# Patient Record
Sex: FEMALE | Race: WHITE | Employment: FULL TIME | URBAN - METROPOLITAN AREA
[De-identification: names, ages, dates, MRNs, and addresses within clinical notes are randomized per-mention and may not be internally consistent; named-entity substitution may affect disease eponyms.]

---

## 2021-02-01 ENCOUNTER — APPOINTMENT (OUTPATIENT)
Dept: GENERAL RADIOLOGY | Age: 26
End: 2021-02-01
Payer: COMMERCIAL

## 2021-02-01 ENCOUNTER — HOSPITAL ENCOUNTER (EMERGENCY)
Age: 26
Discharge: HOME | End: 2021-02-01
Attending: EMERGENCY MEDICINE
Payer: COMMERCIAL

## 2021-02-01 VITALS
TEMPERATURE: 98.4 F | WEIGHT: 135 LBS | SYSTOLIC BLOOD PRESSURE: 102 MMHG | HEART RATE: 95 BPM | OXYGEN SATURATION: 97 % | HEIGHT: 59 IN | RESPIRATION RATE: 20 BRPM | BODY MASS INDEX: 27.21 KG/M2 | DIASTOLIC BLOOD PRESSURE: 68 MMHG

## 2021-02-01 DIAGNOSIS — U07.1 COVID-19 VIRUS INFECTION: Primary | ICD-10-CM

## 2021-02-01 LAB
ANION GAP SERPL CALCULATED.3IONS-SCNC: 10 MMOL/L (ref 3–16)
ANISOCYTOSIS: ABNORMAL
BASE EXCESS VENOUS: -0.1 MMOL/L (ref -2–3)
BASOPHILS ABSOLUTE: 0 K/UL (ref 0–0.2)
BASOPHILS RELATIVE PERCENT: 0.3 %
BUN BLDV-MCNC: 9 MG/DL (ref 7–20)
CALCIUM SERPL-MCNC: 8.6 MG/DL (ref 8.3–10.6)
CARBOXYHEMOGLOBIN: 0.9 % (ref 0–1.5)
CHLORIDE BLD-SCNC: 102 MMOL/L (ref 99–110)
CO2: 24 MMOL/L (ref 21–32)
CREAT SERPL-MCNC: 0.7 MG/DL (ref 0.6–1.1)
EOSINOPHILS ABSOLUTE: 0 K/UL (ref 0–0.6)
EOSINOPHILS RELATIVE PERCENT: 0.1 %
GFR AFRICAN AMERICAN: >60
GFR NON-AFRICAN AMERICAN: >60
GLUCOSE BLD-MCNC: 111 MG/DL (ref 70–99)
HCO3 VENOUS: 25.9 MMOL/L (ref 24–28)
HCT VFR BLD CALC: 34.8 % (ref 36–48)
HEMOGLOBIN: 10.9 G/DL (ref 12–16)
LYMPHOCYTES ABSOLUTE: 1.5 K/UL (ref 1–5.1)
LYMPHOCYTES RELATIVE PERCENT: 37.8 %
MCH RBC QN AUTO: 21.9 PG (ref 26–34)
MCHC RBC AUTO-ENTMCNC: 31.4 G/DL (ref 31–36)
MCV RBC AUTO: 69.7 FL (ref 80–100)
METHEMOGLOBIN VENOUS: 0.2 % (ref 0–1.5)
MONOCYTES ABSOLUTE: 0.4 K/UL (ref 0–1.3)
MONOCYTES RELATIVE PERCENT: 11.2 %
NEUTROPHILS ABSOLUTE: 2 K/UL (ref 1.7–7.7)
NEUTROPHILS RELATIVE PERCENT: 50.6 %
O2 SAT, VEN: 47 %
PCO2, VEN: 46.7 MMHG (ref 41–51)
PDW BLD-RTO: 16.3 % (ref 12.4–15.4)
PH VENOUS: 7.35 (ref 7.35–7.45)
PLATELET # BLD: 181 K/UL (ref 135–450)
PLATELET SLIDE REVIEW: ADEQUATE
PMV BLD AUTO: 8.2 FL (ref 5–10.5)
PO2, VEN: 28.5 MMHG (ref 25–40)
POLYCHROMASIA: ABNORMAL
POTASSIUM SERPL-SCNC: 3.5 MMOL/L (ref 3.5–5.1)
RBC # BLD: 5 M/UL (ref 4–5.2)
SLIDE REVIEW: ABNORMAL
SODIUM BLD-SCNC: 136 MMOL/L (ref 136–145)
TCO2 CALC VENOUS: 27 MMOL/L
WBC # BLD: 4 K/UL (ref 4–11)

## 2021-02-01 PROCEDURE — 82803 BLOOD GASES ANY COMBINATION: CPT

## 2021-02-01 PROCEDURE — 6360000002 HC RX W HCPCS: Performed by: NURSE PRACTITIONER

## 2021-02-01 PROCEDURE — 99284 EMERGENCY DEPT VISIT MOD MDM: CPT

## 2021-02-01 PROCEDURE — 71045 X-RAY EXAM CHEST 1 VIEW: CPT

## 2021-02-01 PROCEDURE — 96374 THER/PROPH/DIAG INJ IV PUSH: CPT

## 2021-02-01 PROCEDURE — 2580000003 HC RX 258: Performed by: NURSE PRACTITIONER

## 2021-02-01 PROCEDURE — 85025 COMPLETE CBC W/AUTO DIFF WBC: CPT

## 2021-02-01 PROCEDURE — 80048 BASIC METABOLIC PNL TOTAL CA: CPT

## 2021-02-01 RX ORDER — ONDANSETRON 2 MG/ML
4 INJECTION INTRAMUSCULAR; INTRAVENOUS ONCE
Status: COMPLETED | OUTPATIENT
Start: 2021-02-01 | End: 2021-02-01

## 2021-02-01 RX ORDER — SODIUM CHLORIDE, SODIUM LACTATE, POTASSIUM CHLORIDE, CALCIUM CHLORIDE 600; 310; 30; 20 MG/100ML; MG/100ML; MG/100ML; MG/100ML
1000 INJECTION, SOLUTION INTRAVENOUS CONTINUOUS
Status: ACTIVE | OUTPATIENT
Start: 2021-02-01 | End: 2021-02-01

## 2021-02-01 RX ADMIN — ONDANSETRON 4 MG: 2 INJECTION INTRAMUSCULAR; INTRAVENOUS at 18:05

## 2021-02-01 RX ADMIN — SODIUM CHLORIDE, POTASSIUM CHLORIDE, SODIUM LACTATE AND CALCIUM CHLORIDE 1000 ML: 600; 310; 30; 20 INJECTION, SOLUTION INTRAVENOUS at 18:05

## 2021-02-01 ASSESSMENT — ENCOUNTER SYMPTOMS
DIARRHEA: 0
COUGH: 1
ABDOMINAL PAIN: 0
SHORTNESS OF BREATH: 1
NAUSEA: 1
VOMITING: 1

## 2021-02-01 NOTE — ED TRIAGE NOTES
Pt found out she was COVID+ Friday. Since, she has had numbness and tingling in her hands and feet. Nausea and emesis started today. She has a subjective feeling of SOB. V/S stable.

## 2021-02-01 NOTE — ED PROVIDER NOTES
ED Attending Attestation Note     Date of evaluation: 2/1/2021    This patient was seen by the [facility]. I have seen and examined the patient, agree with the workup, evaluation, management and diagnosis. The care plan has been discussed. I have reviewed the ECG and concur with the [facility]'s interpretation. I was present for any procedures performed in the [facility]'s note and have made edits to the note where appropriate. My assessment reveals 23 y.o. female with history of 6 days of flulike illness and diagnosis of Covid 3 days ago presenting to the emergency department today with hand and feet paresthesias and shortness of breath. Patient states she felt more short of breath 2 days ago and it has since resolved. She continues to have coughing and spikes fevers. The tingling in the hands and feet is most prominent when she has a high temperature. On exam, she is well-appearing, heart regular rate and rhythm, no respiratory distress,, occasional faint rhonchi bilaterally, no lower extremity edema. Suspect this is ongoing Covid symptomatology and will continue to treat clinically. Highly doubt superimposed bacterial pneumonia, PE, and no symptoms to suggest cardiac etiology. Will obtain labs and chest x-ray.          Bob Walker MD  02/01/21 [id number]

## 2021-02-01 NOTE — ED NOTES
Bed: 515  Expected date:   Expected time:   Means of arrival: Walk In  Comments:     Nelida Ambrocio RN  02/01/21 [id number]

## 2021-02-02 ENCOUNTER — CARE COORDINATION (OUTPATIENT)
Dept: CARE COORDINATION | Age: 26
End: 2021-02-02

## 2021-02-02 NOTE — CARE COORDINATION
An outreach phone call was placed to patient's listed residential phone number without success. A message was left providing contact information.

## 2021-02-03 ENCOUNTER — CARE COORDINATION (OUTPATIENT)
Dept: CARE COORDINATION | Age: 26
End: 2021-02-03

## 2021-02-03 NOTE — CARE COORDINATION
A second outreach phone call was attempted without success to patient's listed residential phone number. A message was left providing contact information. No further outreach will be conducted.
COUGH/BODY ACHES/FEVER/CONGESTION

## 2022-06-24 NOTE — ED NOTES
Patient discharged to home via family. Written discharge instructions reviewed with understanding. Copy of AVS and all belongings sent home with patient. Patient able to walk from ED without assistance.        Teena Gonzalez RN  02/01/21 0090 24-Jun-2022 22:50